# Patient Record
Sex: MALE | Race: WHITE | NOT HISPANIC OR LATINO | Employment: FULL TIME | URBAN - METROPOLITAN AREA
[De-identification: names, ages, dates, MRNs, and addresses within clinical notes are randomized per-mention and may not be internally consistent; named-entity substitution may affect disease eponyms.]

---

## 2021-03-29 ENCOUNTER — HOSPITAL ENCOUNTER (EMERGENCY)
Facility: CLINIC | Age: 51
Discharge: HOME | End: 2021-03-29
Attending: EMERGENCY MEDICINE | Admitting: EMERGENCY MEDICINE
Payer: COMMERCIAL

## 2021-03-29 ENCOUNTER — APPOINTMENT (OUTPATIENT)
Dept: CT IMAGING | Facility: CLINIC | Age: 51
End: 2021-03-29
Attending: EMERGENCY MEDICINE
Payer: COMMERCIAL

## 2021-03-29 VITALS
DIASTOLIC BLOOD PRESSURE: 99 MMHG | RESPIRATION RATE: 18 BRPM | SYSTOLIC BLOOD PRESSURE: 143 MMHG | TEMPERATURE: 97.1 F | OXYGEN SATURATION: 97 % | HEART RATE: 97 BPM

## 2021-03-29 DIAGNOSIS — R10.31 RLQ ABDOMINAL PAIN: ICD-10-CM

## 2021-03-29 DIAGNOSIS — I10 HYPERTENSION, UNSPECIFIED TYPE: ICD-10-CM

## 2021-03-29 LAB
ALBUMIN UR-MCNC: NEGATIVE MG/DL
ANION GAP SERPL CALCULATED.3IONS-SCNC: 11 MMOL/L (ref 3–14)
APPEARANCE UR: CLEAR
BACTERIA #/AREA URNS HPF: ABNORMAL /HPF
BASOPHILS # BLD AUTO: 0 10E9/L (ref 0–0.2)
BASOPHILS NFR BLD AUTO: 0.4 %
BILIRUB UR QL STRIP: NEGATIVE
BUN SERPL-MCNC: 11 MG/DL (ref 7–30)
CALCIUM SERPL-MCNC: 10 MG/DL (ref 8.5–10.1)
CHLORIDE SERPL-SCNC: 104 MMOL/L (ref 94–109)
CO2 SERPL-SCNC: 23 MMOL/L (ref 20–32)
COLOR UR AUTO: ABNORMAL
CREAT SERPL-MCNC: 0.82 MG/DL (ref 0.66–1.25)
DIFFERENTIAL METHOD BLD: NORMAL
EOSINOPHIL # BLD AUTO: 0.2 10E9/L (ref 0–0.7)
EOSINOPHIL NFR BLD AUTO: 1.9 %
ERYTHROCYTE [DISTWIDTH] IN BLOOD BY AUTOMATED COUNT: 11.8 % (ref 10–15)
GFR SERPL CREATININE-BSD FRML MDRD: >90 ML/MIN/{1.73_M2}
GLUCOSE SERPL-MCNC: 96 MG/DL (ref 70–99)
GLUCOSE UR STRIP-MCNC: NEGATIVE MG/DL
HCT VFR BLD AUTO: 48.6 % (ref 40–53)
HGB BLD-MCNC: 17.1 G/DL (ref 13.3–17.7)
HGB UR QL STRIP: NEGATIVE
IMM GRANULOCYTES # BLD: 0 10E9/L (ref 0–0.4)
IMM GRANULOCYTES NFR BLD: 0.2 %
KETONES UR STRIP-MCNC: NEGATIVE MG/DL
LEUKOCYTE ESTERASE UR QL STRIP: NEGATIVE
LYMPHOCYTES # BLD AUTO: 1.4 10E9/L (ref 0.8–5.3)
LYMPHOCYTES NFR BLD AUTO: 16.9 %
MCH RBC QN AUTO: 31.2 PG (ref 26.5–33)
MCHC RBC AUTO-ENTMCNC: 35.2 G/DL (ref 31.5–36.5)
MCV RBC AUTO: 89 FL (ref 78–100)
MONOCYTES # BLD AUTO: 0.8 10E9/L (ref 0–1.3)
MONOCYTES NFR BLD AUTO: 9.2 %
NEUTROPHILS # BLD AUTO: 6.1 10E9/L (ref 1.6–8.3)
NEUTROPHILS NFR BLD AUTO: 71.4 %
NITRATE UR QL: NEGATIVE
NRBC # BLD AUTO: 0 10*3/UL
NRBC BLD AUTO-RTO: 0 /100
PH UR STRIP: 6 PH (ref 5–7)
PLATELET # BLD AUTO: 287 10E9/L (ref 150–450)
POTASSIUM SERPL-SCNC: 3.4 MMOL/L (ref 3.4–5.3)
RBC # BLD AUTO: 5.48 10E12/L (ref 4.4–5.9)
RBC #/AREA URNS AUTO: 1 /HPF (ref 0–2)
SODIUM SERPL-SCNC: 138 MMOL/L (ref 133–144)
SOURCE: ABNORMAL
SP GR UR STRIP: 1.01 (ref 1–1.03)
SQUAMOUS #/AREA URNS AUTO: <1 /HPF (ref 0–1)
UROBILINOGEN UR STRIP-MCNC: NORMAL MG/DL (ref 0–2)
WBC # BLD AUTO: 8.5 10E9/L (ref 4–11)
WBC #/AREA URNS AUTO: 1 /HPF (ref 0–5)

## 2021-03-29 PROCEDURE — 250N000011 HC RX IP 250 OP 636: Performed by: EMERGENCY MEDICINE

## 2021-03-29 PROCEDURE — 250N000009 HC RX 250: Performed by: EMERGENCY MEDICINE

## 2021-03-29 PROCEDURE — 80048 BASIC METABOLIC PNL TOTAL CA: CPT | Performed by: EMERGENCY MEDICINE

## 2021-03-29 PROCEDURE — 74177 CT ABD & PELVIS W/CONTRAST: CPT

## 2021-03-29 PROCEDURE — 81001 URINALYSIS AUTO W/SCOPE: CPT | Performed by: EMERGENCY MEDICINE

## 2021-03-29 PROCEDURE — 99285 EMERGENCY DEPT VISIT HI MDM: CPT | Mod: 25

## 2021-03-29 PROCEDURE — 85025 COMPLETE CBC W/AUTO DIFF WBC: CPT | Performed by: EMERGENCY MEDICINE

## 2021-03-29 RX ORDER — IOPAMIDOL 755 MG/ML
500 INJECTION, SOLUTION INTRAVASCULAR ONCE
Status: COMPLETED | OUTPATIENT
Start: 2021-03-29 | End: 2021-03-29

## 2021-03-29 RX ORDER — LISINOPRIL AND HYDROCHLOROTHIAZIDE 12.5; 2 MG/1; MG/1
1 TABLET ORAL DAILY
Qty: 30 TABLET | Refills: 0 | Status: SHIPPED | OUTPATIENT
Start: 2021-03-29

## 2021-03-29 RX ADMIN — SODIUM CHLORIDE 65 ML: 9 INJECTION, SOLUTION INTRAVENOUS at 20:05

## 2021-03-29 RX ADMIN — IOPAMIDOL 100 ML: 755 INJECTION, SOLUTION INTRAVENOUS at 20:05

## 2021-03-29 ASSESSMENT — ENCOUNTER SYMPTOMS
HEMATURIA: 0
CONSTIPATION: 0
ABDOMINAL PAIN: 1
FEVER: 0
BLOOD IN STOOL: 0
DYSURIA: 0
VOMITING: 0
DIARRHEA: 0
NAUSEA: 0

## 2021-03-29 NOTE — ED PROVIDER NOTES
"  History   Chief Complaint:  Abdominal Pain     HPI   Robert Tejada is a 50 year old male with history of hypertension and Crohn's disease who presents with lower right quadrant abdominal pain. The patient states he woke up this morning, and around 0900 he began to have right lower quadrant abdominal pain that he describes as a \"stinger.\" The pain subsided for awhile, but later in the day the pain returned for a few hours. Here in the ED he is currently not in any pain but mentions concerns for appendicitis. He denies nausea, vomiting, diarrhea, and fever. He further denies any dysuria, hematuria, constipation, or blood in his stool. He has not had any abdominal surgeries. Of note he is prescribed a combination lisinopril pill but has not taken it for the past year due to Covid-related stress. His last BP reading was one week ago and read 168/70.     In regards to the patient's history of Crohn's disease he says today's symptoms feel different in nature as they are more severe. His last Crohn's flare up was approximately 1 year ago and he currently does not take any medications as he is in remission.     Review of Systems   Constitutional: Negative for fever.   Gastrointestinal: Positive for abdominal pain. Negative for blood in stool, constipation, diarrhea, nausea and vomiting.   Genitourinary: Negative for dysuria and hematuria.   All other systems reviewed and are negative.    Allergies:  No known drug allergies    Medications:  Lisinopril-Hydrochlorothiazide (Denies)    Past Medical History:    Hypertension  Regional Enteritis  Folliculitis  Rash    Past Surgical History:    No past surgical history on file.    Family History:    Father - Alzheimer's, Hearing Loss, Hypertension    Social History:  Presents with his wife.  Marital Status:   Resides in [place].  Former smoker.    Physical Exam     Patient Vitals for the past 24 hrs:   BP Temp Temp src Pulse Resp SpO2   03/29/21 2045 (!) 174/109 -- -- " 109 -- 98 %   03/29/21 2030 (!) 174/105 -- -- 109 -- 97 %   03/29/21 2000 (!) 183/114 -- -- 112 -- 94 %   03/29/21 1945 (!) 171/112 -- -- 106 -- 94 %   03/29/21 1930 (!) 174/119 -- -- 114 -- 98 %   03/29/21 1905 -- -- -- -- -- 97 %   03/29/21 1900 (!) 181/112 -- -- 111 -- --   03/29/21 1855 -- -- -- 120 -- 96 %   03/29/21 1850 (!) 215/138 -- -- -- -- --   03/29/21 1834 (!) 220/134 97.1  F (36.2  C) Temporal 127 18 97 %       Physical Exam  General: Patient is alert and cooperative.  HENT:  Normal nose, oropharynx.   Eyes: EOMI. Normal conjunctiva.  Neck:  Normal range of motion and appearance.   Cardiovascular:  Normal rate, regular rhythm.   Pulmonary/Chest:  Effort normal. No wheezing or crackles.  Abdominal: Soft. No distension or tenderness.     Musculoskeletal: Normal range of motion. No edema or tenderness.   Neurological: oriented, normal strength, sensation, and coordination.   Skin: Warm and dry. No rash or bruising.   Psychiatric: Normal mood and affect. Normal behavior and judgement.    Emergency Department Course   Imaging:  CT Abdomen/Pelvis with IV and Oral Contrast:   IMPRESSION:   1.  No appendicitis.   2.  Fatty liver.   3.  No urinary calculi or hydronephrosis.  per radiology.     Laboratory:  CBC: WBC 8.5, HGB 17.1,    BMP: AWNL (Creatinine 0.82)     UA: Bacteria Few (!), o/w Negative    Emergency Department Course:    Reviewed:  I reviewed nursing notes, vitals, past medical history and care everywhere    Assessments:  1856 I obtained history and examined the patient as noted above.  2020 Patient rechecked and updated.    2121 I rechecked the patient and explained CT findings.   2125 Patient set for discharge.    Disposition:  The patient was discharged to home.     Impression & Plan   Medical Decision Making:  Afebrile 50-year-old male with complaints of acute right lower quadrant abdominal pain.  No associated symptoms of concern and discomfort has essentially now completely resolved.   He does have a history of Crohn's disease which has been in remission and is not requiring any current maintenance medications.  No previous abdominal surgeries or Crohn's complications.  He is anxious but otherwise well-appearing with a fairly benign abdominal exam.  However given this clinical presentation, I felt imaging was warranted.  CT abdomen pelvis shows no evidence of appendicitis, Crohn's exacerbation, or any other inflammatory or acute findings to account for his symptoms.  He is greatly relieved.  Labs are unremarkable as well.  He does have a history of hypertension and has been off his medication for over a year.  He is being restarted on his Zestoretic.  Etiology for his abdominal pain is felt to be benign its essentially now resolved.  I have recommended that he monitor his blood pressure and schedule outpatient follow-up for reevaluation of his blood pressure and reassessment if he continues to have intermittent unexplained abdominal pain.    Covid-19  Robert Tejada was evaluated during a global COVID-19 pandemic, which necessitated consideration that the patient might be at risk for infection with the SARS-CoV-2 virus that causes COVID-19.   Applicable protocols for evaluation were followed during the patient's care.   COVID-19 was considered as part of the patient's evaluation.     Diagnosis:    ICD-10-CM    1. RLQ abdominal pain  R10.31    2. Hypertension, unspecified type  I10        Discharge Medications:  Discharge Medication List as of 3/29/2021  9:36 PM      START taking these medications    Details   lisinopril-hydrochlorothiazide (ZESTORETIC) 20-12.5 MG tablet Take 1 tablet by mouth daily, Disp-30 tablet, R-0, Local Print           Scribe Disclosure:  Emily MERAZ, am serving as a scribe at 6:53 PM on 3/29/2021 to document services personally performed by Onel Goins MD based on my observations and the provider's statements to me.     This note was completed in part using  [employer] voice recognition software. Although reviewed after completion, some word and grammatical errors may occur.     Onel Goins MD  03/30/21 7911

## 2021-03-29 NOTE — ED TRIAGE NOTES
RLQ abdominal pain since 0900. Intermittent. Denies N/V/D. Hx of Crohns. Decreased appetite. Feeling anxious

## 2021-07-03 ENCOUNTER — HEALTH MAINTENANCE LETTER (OUTPATIENT)
Age: 51
End: 2021-07-03

## 2021-10-23 ENCOUNTER — HEALTH MAINTENANCE LETTER (OUTPATIENT)
Age: 51
End: 2021-10-23

## 2022-07-30 ENCOUNTER — HEALTH MAINTENANCE LETTER (OUTPATIENT)
Age: 52
End: 2022-07-30

## 2022-10-09 ENCOUNTER — HEALTH MAINTENANCE LETTER (OUTPATIENT)
Age: 52
End: 2022-10-09

## 2023-08-19 ENCOUNTER — HEALTH MAINTENANCE LETTER (OUTPATIENT)
Age: 53
End: 2023-08-19